# Patient Record
Sex: MALE | Race: WHITE
[De-identification: names, ages, dates, MRNs, and addresses within clinical notes are randomized per-mention and may not be internally consistent; named-entity substitution may affect disease eponyms.]

---

## 2018-01-19 ENCOUNTER — HOSPITAL ENCOUNTER (OUTPATIENT)
Dept: HOSPITAL 74 - JASU-SURG | Age: 38
Discharge: HOME | End: 2018-01-19
Attending: UROLOGY
Payer: COMMERCIAL

## 2018-01-19 VITALS — HEART RATE: 68 BPM | DIASTOLIC BLOOD PRESSURE: 78 MMHG | SYSTOLIC BLOOD PRESSURE: 116 MMHG

## 2018-01-19 VITALS — TEMPERATURE: 97.8 F

## 2018-01-19 VITALS — BODY MASS INDEX: 30.8 KG/M2

## 2018-01-19 DIAGNOSIS — N48.89: ICD-10-CM

## 2018-01-19 DIAGNOSIS — N47.1: Primary | ICD-10-CM

## 2018-01-19 PROCEDURE — 0VTTXZZ RESECTION OF PREPUCE, EXTERNAL APPROACH: ICD-10-PCS | Performed by: UROLOGY

## 2018-01-19 PROCEDURE — 0VNSXZZ RELEASE PENIS, EXTERNAL APPROACH: ICD-10-PCS | Performed by: UROLOGY

## 2018-01-19 NOTE — OP
DATE OF OPERATION:  01/19/2018

 

PREOPERATIVE DIAGNOSIS:  Phimosis and chordae.

 

POSTOPERATIVE DIAGNOSIS:  Phimosis and chordae.

 

OPERATIVE PROCEDURE:  Circumcision penoplasty.  

 

ANESTHESIA:  General and penile block.  

 

OPERATIVE PROCEDURE:  Under general anesthesia, patient is prepped and draped in the

usual sterile manner.  He is placed in the supine position.  A tourniquet was placed

at the base of the penis, and approximately 70 mL of normal saline was injected via

25-gauge butterfly into the base of the right corpora cavernosum.  An artificial

erection revealed chordae of the glans penis due to a short frenulum.  Therefore, the

tourniquet was released, the circumferential incision was made 1 cm below the corona

of the penis.  This was carried down through skin and subcutaneous tissue using a

Trent scissors.  Hemostasis was secured with electrocoagulation.  Fibrous tissue

encompassing the _____ portion of the urethra was lysed using a Trent scissors. 

The urethra was freed from the corpus spongiosum.  A repeat artificial erection

revealed straightening of the penis.  The frenulum was also excised and suture

ligated proximally and distally using 5-0 Dexon suture ligature.  Excess foreskin was

then excised in a circumferential manner.  Again, hemostasis was secured with

electrocoagulation.  Skin from the glans was then approximated to skin from the shaft

using 4-0 chromic suture ligatures.  Pressure dressing was applied.  The base of the

penis was then blocked with 0.25% Marcaine for long-term analgesia.  The patient

tolerated the procedure well.  He returned to the recovery room in good condition.  

 

 

KATHERIN [id number]

DD: 01/19/2018 14:12

DT: 01/19/2018 18:47

Job #:  [id number]

## 2018-01-20 NOTE — HP
DATE OF ADMISSION:

 

HISTORY:  The patient is a 37-year-old male with a history of micturition disorder

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  He has had multiple

episodes of balanitis treated with antifungals.  The patient also complains of

curvature at the glans penis causing pain during erections.  He has had multiple

episodes of frenular bleeding due to the short, scarred frenulum at the ventral

aspect of the glans penis.  

 

ALLERGIES:  He denies any allergies.

 

SOCIAL HISTORY:  He does drink socially.  Denies tobacco.

 

PHYSICAL EXAMINATION: 

General:  Reveals a well-developed adult male.

Chest:  Clear.

Abdomen:  Soft.  No CVA tenderness.

Genitalia:  Reveal normal testes.  No hernias or hydroceles are elicited.  Phallus

reveals a phimotic foreskin with a short, scarred frenulum and a distal penile

chordee.  Perineum is atraumatic.  Normal sensation.  Bulbocavernosus was intact. 

Saddle sensation is present.  Prostate is 1+, smooth, benign, and nontender.

 

The patient underwent preoperative laboratory workup.  BUN 60, creatinine 0.7.  Liver

enzymes were all within normal limits.  His CBC was also within normal limits. 

Platelet count was 301.

 

IMPRESSION:  At present:

1.  Penile chordee.

2.  Phimosis balanitis.

 

PLAN:  Circumcision and penoplasty.  This was explained in detail to the patient, and

he agrees.

 

 

 

OTILIO TRAMMELL M.D.

 

[id number]

DD: 01/19/2018 14:08

DT: 01/20/2018 08:26

Job #:  [id number]

## 2018-01-24 NOTE — PATH
Surgical Pathology Report



Patient Name:  ANASTACIO MOSES

Accession #:  

Med. Rec. #:  [id number]                                                        

   /Age/Gender:  1980 (Age: 37) / M

Account:  [id number]                                                          

             Location: [facility] SURGICAL

Taken:  2018

Received:  2018

Reported:  2018

Physicians:  Jomar Wild M.D.

  



Specimen(s) Received

 FORESKIN 





Clinical History

Phimosis







Final Diagnosis

FORESKIN, CIRCUMCISION:

FORESKIN WITH MARKED CHRONIC INFLAMMATION WITH DERMAL SCLEROSIS CONSISTENT WITH

BALANITIS XEROTICA OBLITERANS.

NO DYSPLASIA OR CARCINOMA IDENTIFIED.





***Electronically Signed***

Jason Soler M.D.





Gross Description

Received in formalin labeled "foreskin," is a 2.8 x 2.0 x 1.0 cm brown,

irregular, wrinkled portion of skin, consistent with foreskin. No discrete

epidermal lesions are identified. A representative section is submitted in one

cassette.

/2018



saudi

## 2020-01-28 PROBLEM — Z00.00 ENCOUNTER FOR PREVENTIVE HEALTH EXAMINATION: Status: ACTIVE | Noted: 2020-01-28
